# Patient Record
Sex: MALE | Race: BLACK OR AFRICAN AMERICAN | NOT HISPANIC OR LATINO | URBAN - METROPOLITAN AREA
[De-identification: names, ages, dates, MRNs, and addresses within clinical notes are randomized per-mention and may not be internally consistent; named-entity substitution may affect disease eponyms.]

---

## 2024-04-12 ENCOUNTER — EMERGENCY (EMERGENCY)
Facility: HOSPITAL | Age: 44
LOS: 1 days | Discharge: HOME | End: 2024-04-12
Attending: EMERGENCY MEDICINE | Admitting: EMERGENCY MEDICINE
Payer: MEDICAID

## 2024-04-12 VITALS
OXYGEN SATURATION: 96 % | SYSTOLIC BLOOD PRESSURE: 141 MMHG | TEMPERATURE: 98 F | RESPIRATION RATE: 19 BRPM | DIASTOLIC BLOOD PRESSURE: 88 MMHG | HEART RATE: 77 BPM

## 2024-04-12 VITALS
SYSTOLIC BLOOD PRESSURE: 155 MMHG | OXYGEN SATURATION: 100 % | RESPIRATION RATE: 16 BRPM | DIASTOLIC BLOOD PRESSURE: 97 MMHG | TEMPERATURE: 98 F | HEART RATE: 83 BPM

## 2024-04-12 LAB
ALBUMIN SERPL ELPH-MCNC: 4.5 G/DL — SIGNIFICANT CHANGE UP (ref 3.3–5)
ALP SERPL-CCNC: 121 U/L — HIGH (ref 40–120)
ALT FLD-CCNC: 27 U/L — SIGNIFICANT CHANGE UP (ref 4–41)
ANION GAP SERPL CALC-SCNC: 16 MMOL/L — HIGH (ref 7–14)
APPEARANCE UR: ABNORMAL
AST SERPL-CCNC: 31 U/L — SIGNIFICANT CHANGE UP (ref 4–40)
BASOPHILS # BLD AUTO: 0.04 K/UL — SIGNIFICANT CHANGE UP (ref 0–0.2)
BASOPHILS NFR BLD AUTO: 0.4 % — SIGNIFICANT CHANGE UP (ref 0–2)
BILIRUB SERPL-MCNC: 0.8 MG/DL — SIGNIFICANT CHANGE UP (ref 0.2–1.2)
BILIRUB UR-MCNC: NEGATIVE — SIGNIFICANT CHANGE UP
BUN SERPL-MCNC: 14 MG/DL — SIGNIFICANT CHANGE UP (ref 7–23)
CALCIUM SERPL-MCNC: 9.4 MG/DL — SIGNIFICANT CHANGE UP (ref 8.4–10.5)
CHLORIDE SERPL-SCNC: 106 MMOL/L — SIGNIFICANT CHANGE UP (ref 98–107)
CO2 SERPL-SCNC: 20 MMOL/L — LOW (ref 22–31)
COLOR SPEC: ABNORMAL
CREAT SERPL-MCNC: 0.91 MG/DL — SIGNIFICANT CHANGE UP (ref 0.5–1.3)
DIFF PNL FLD: ABNORMAL
EGFR: 107 ML/MIN/1.73M2 — SIGNIFICANT CHANGE UP
EOSINOPHIL # BLD AUTO: 0.11 K/UL — SIGNIFICANT CHANGE UP (ref 0–0.5)
EOSINOPHIL NFR BLD AUTO: 1.2 % — SIGNIFICANT CHANGE UP (ref 0–6)
GLUCOSE SERPL-MCNC: 106 MG/DL — HIGH (ref 70–99)
GLUCOSE UR QL: NEGATIVE MG/DL — SIGNIFICANT CHANGE UP
HCT VFR BLD CALC: 43.7 % — SIGNIFICANT CHANGE UP (ref 39–50)
HGB BLD-MCNC: 15 G/DL — SIGNIFICANT CHANGE UP (ref 13–17)
IANC: 6.22 K/UL — SIGNIFICANT CHANGE UP (ref 1.8–7.4)
IMM GRANULOCYTES NFR BLD AUTO: 0.4 % — SIGNIFICANT CHANGE UP (ref 0–0.9)
KETONES UR-MCNC: NEGATIVE MG/DL — SIGNIFICANT CHANGE UP
LEUKOCYTE ESTERASE UR-ACNC: ABNORMAL
LYMPHOCYTES # BLD AUTO: 2.39 K/UL — SIGNIFICANT CHANGE UP (ref 1–3.3)
LYMPHOCYTES # BLD AUTO: 25.1 % — SIGNIFICANT CHANGE UP (ref 13–44)
MCHC RBC-ENTMCNC: 29.6 PG — SIGNIFICANT CHANGE UP (ref 27–34)
MCHC RBC-ENTMCNC: 34.3 GM/DL — SIGNIFICANT CHANGE UP (ref 32–36)
MCV RBC AUTO: 86.2 FL — SIGNIFICANT CHANGE UP (ref 80–100)
MONOCYTES # BLD AUTO: 0.73 K/UL — SIGNIFICANT CHANGE UP (ref 0–0.9)
MONOCYTES NFR BLD AUTO: 7.7 % — SIGNIFICANT CHANGE UP (ref 2–14)
NEUTROPHILS # BLD AUTO: 6.22 K/UL — SIGNIFICANT CHANGE UP (ref 1.8–7.4)
NEUTROPHILS NFR BLD AUTO: 65.2 % — SIGNIFICANT CHANGE UP (ref 43–77)
NITRITE UR-MCNC: NEGATIVE — SIGNIFICANT CHANGE UP
NRBC # BLD: 0 /100 WBCS — SIGNIFICANT CHANGE UP (ref 0–0)
NRBC # FLD: 0 K/UL — SIGNIFICANT CHANGE UP (ref 0–0)
PH UR: 6 — SIGNIFICANT CHANGE UP (ref 5–8)
PLATELET # BLD AUTO: 286 K/UL — SIGNIFICANT CHANGE UP (ref 150–400)
POTASSIUM SERPL-MCNC: 3.4 MMOL/L — LOW (ref 3.5–5.3)
POTASSIUM SERPL-SCNC: 3.4 MMOL/L — LOW (ref 3.5–5.3)
PROT SERPL-MCNC: 8.1 G/DL — SIGNIFICANT CHANGE UP (ref 6–8.3)
PROT UR-MCNC: 30 MG/DL
RBC # BLD: 5.07 M/UL — SIGNIFICANT CHANGE UP (ref 4.2–5.8)
RBC # FLD: 13.8 % — SIGNIFICANT CHANGE UP (ref 10.3–14.5)
SODIUM SERPL-SCNC: 142 MMOL/L — SIGNIFICANT CHANGE UP (ref 135–145)
SP GR SPEC: 1.01 — SIGNIFICANT CHANGE UP (ref 1–1.03)
UROBILINOGEN FLD QL: 0.2 MG/DL — SIGNIFICANT CHANGE UP (ref 0.2–1)
WBC # BLD: 9.53 K/UL — SIGNIFICANT CHANGE UP (ref 3.8–10.5)
WBC # FLD AUTO: 9.53 K/UL — SIGNIFICANT CHANGE UP (ref 3.8–10.5)

## 2024-04-12 PROCEDURE — 99285 EMERGENCY DEPT VISIT HI MDM: CPT

## 2024-04-12 PROCEDURE — 93010 ELECTROCARDIOGRAM REPORT: CPT

## 2024-04-12 RX ORDER — LIDOCAINE HYDROCHLORIDE AND EPINEPHRINE 10; 10 MG/ML; UG/ML
5 INJECTION, SOLUTION INFILTRATION; PERINEURAL ONCE
Refills: 0 | Status: COMPLETED | OUTPATIENT
Start: 2024-04-12 | End: 2024-04-12

## 2024-04-12 RX ORDER — OXYCODONE AND ACETAMINOPHEN 5; 325 MG/1; MG/1
1 TABLET ORAL
Qty: 9 | Refills: 0
Start: 2024-04-12 | End: 2024-04-14

## 2024-04-12 RX ORDER — MORPHINE SULFATE 50 MG/1
4 CAPSULE, EXTENDED RELEASE ORAL ONCE
Refills: 0 | Status: DISCONTINUED | OUTPATIENT
Start: 2024-04-12 | End: 2024-04-12

## 2024-04-12 RX ORDER — MIDAZOLAM HYDROCHLORIDE 1 MG/ML
5 INJECTION, SOLUTION INTRAMUSCULAR; INTRAVENOUS ONCE
Refills: 0 | Status: DISCONTINUED | OUTPATIENT
Start: 2024-04-12 | End: 2024-04-12

## 2024-04-12 RX ORDER — FENTANYL CITRATE 50 UG/ML
50 INJECTION INTRAVENOUS ONCE
Refills: 0 | Status: DISCONTINUED | OUTPATIENT
Start: 2024-04-12 | End: 2024-04-12

## 2024-04-12 RX ORDER — CEPHALEXIN 500 MG
500 CAPSULE ORAL ONCE
Refills: 0 | Status: COMPLETED | OUTPATIENT
Start: 2024-04-12 | End: 2024-04-12

## 2024-04-12 RX ORDER — HYDROMORPHONE HYDROCHLORIDE 2 MG/ML
1 INJECTION INTRAMUSCULAR; INTRAVENOUS; SUBCUTANEOUS ONCE
Refills: 0 | Status: DISCONTINUED | OUTPATIENT
Start: 2024-04-12 | End: 2024-04-12

## 2024-04-12 RX ADMIN — LIDOCAINE HYDROCHLORIDE AND EPINEPHRINE 5 MILLILITER(S): 10; 10 INJECTION, SOLUTION INFILTRATION; PERINEURAL at 04:00

## 2024-04-12 RX ADMIN — HYDROMORPHONE HYDROCHLORIDE 1 MILLIGRAM(S): 2 INJECTION INTRAMUSCULAR; INTRAVENOUS; SUBCUTANEOUS at 09:03

## 2024-04-12 RX ADMIN — MIDAZOLAM HYDROCHLORIDE 5 MILLIGRAM(S): 1 INJECTION, SOLUTION INTRAMUSCULAR; INTRAVENOUS at 08:35

## 2024-04-12 RX ADMIN — FENTANYL CITRATE 50 MICROGRAM(S): 50 INJECTION INTRAVENOUS at 04:42

## 2024-04-12 RX ADMIN — FENTANYL CITRATE 50 MICROGRAM(S): 50 INJECTION INTRAVENOUS at 04:17

## 2024-04-12 RX ADMIN — MORPHINE SULFATE 4 MILLIGRAM(S): 50 CAPSULE, EXTENDED RELEASE ORAL at 01:29

## 2024-04-12 RX ADMIN — Medication 500 MILLIGRAM(S): at 07:02

## 2024-04-12 NOTE — ED PROVIDER NOTE - ATTENDING CONTRIBUTION TO CARE
I, Dr Alfonso Hoffmann wrote the initial note in its entirety and the resident only contributed to the progress note and disposition with which I agree.

## 2024-04-12 NOTE — ED ADULT NURSE REASSESSMENT NOTE - NS ED NURSE REASSESS COMMENT FT1
Per ED Attending, Pt cleared to be d/c home. Rose connect to leg bag, draining freely. Peripheral IV discontinued at this time  A/Ox4, ambulated with steady gait, Vital signs stable

## 2024-04-12 NOTE — CHART NOTE - NSCHARTNOTEFT_GEN_A_CORE
was informed by provider that patient is discharged and need assistance with getting taxi home.       scheduled taxi through [facility] portal. Patient will be picked up by [employer] [phone]; Inv# [id number]. Provider informed of above. No further  intervention needed.

## 2024-04-12 NOTE — ED ADULT TRIAGE NOTE - CHIEF COMPLAINT QUOTE
pt c/o worsening urinary retention today. states symptoms have been x1 month, scheduled for urethrotomy 4/29. Last urination 10am. +abdominal distention. hx. HTN. aortic dissection 11/25/23. pt denies chest pain, sob, n/v.

## 2024-04-12 NOTE — CONSULT NOTE ADULT - SUBJECTIVE AND OBJECTIVE BOX
[facility]  43-year-old man with a history of hypertension and aortic dissection s/p repair 11/25/2023 that presents with urinary retention x 12 hours.  Patient states that when he was diagnosed with a aortic dissection in November of last year he had to go to the emergency surgery and at that time they inserted a Rose for surgery and since then he has been diagnosed with urethral strictures with intermittent stricture urinary retention.  He follows with Dr. Perry Chery at [facility] urology.  Patient is scheduled for cystoscopy and urethrotomy 4/29/2024 however tonight he has been able to urinate leading to swelling and distention of his lower abdomen and constipation and diaphoresis.  Patient states that this happened before but was told by his urologist that if he does need a urethral catheter and that it needs to be small so not to cause any more trauma to his urethra.  Patient denies any fevers, chills, chest pain, shortness of breath.  Has been otherwise usual state of health.    Urology consulted for urinary retention. Patient has history of urethral stricture and has surgery scheduled this month for stricture. Patient denies fevers, chills, flank pain, dysuria. He is able to void small amounts but needs to strain.      PAST MEDICAL & SURGICAL HISTORY:      MEDICATIONS  (STANDING):    MEDICATIONS  (PRN):      FAMILY HISTORY:      Allergies    No Known Allergies    Intolerances        SOCIAL HISTORY:    REVIEW OF SYSTEMS:   Otherwise negative as stated in HPI    Physical Exam  Vital signs  T(C): 36.8 (04-12-24 @ 07:28), Max: 36.9 (04-12-24 @ 00:16)  HR: 98 (04-12-24 @ 08:35)  BP: 158/105 (04-12-24 @ 08:35)  SpO2: 97% (04-12-24 @ 08:35)  Wt(kg): --    Output      Gen: NAD  Pulm: No respiratory distress  Abd: Soft, nontender, nondistended, palpable bladder      LABS:      04-12 @ 01:25    WBC 9.53  / Hct 43.7  / SCr 0.91     04-12    142  |  106  |  14  ----------------------------<  106<H>  3.4<L>   |  20<L>  |  0.91    Ca    9.4      12 Apr 2024 01:25    TPro  8.1  /  Alb  4.5  /  TBili  0.8  /  DBili  x   /  AST  31  /  ALT  27  /  AlkPhos  121<H>  04-12      Urinalysis Basic - ( 12 Apr 2024 01:25 )    Color: x / Appearance: x / SG: x / pH: x  Gluc: 106 mg/dL / Ketone: x  / Bili: x / Urobili: x   Blood: x / Protein: x / Nitrite: x   Leuk Esterase: x / RBC: x / WBC x   Sq Epi: x / Non Sq Epi: x / Bacteria: x

## 2024-04-12 NOTE — ED PROVIDER NOTE - PATIENT PORTAL LINK FT
You can access the FollowMyHealth Patient Portal offered by [facility] by registering at the following website: [email or web address]. By joining [employer]’s FollowMyHealth portal, you will also be able to view your health information using other applications (apps) compatible with our system.

## 2024-04-12 NOTE — ED PROVIDER NOTE - NSFOLLOWUPINSTRUCTIONS_ED_ALL_ED_FT
Urinary Retention    Urinary retention is the inability to completely empty your bladder. This is a common problem in older men, especially with enlarged prostates. If you are sent home with a kline catheter and a drainage system make sure to keep the drainage bag emptied and lower than your catheter. Keep the kline catheter in until you follow up with a urologist.    SEEK IMMEDIATE MEDICAL CARE IF YOU DEVELOP THE FOLLOWING SYMPTOMS: the catheter stops draining urine, the catheter falls out, abdominal pain, nausea/vomiting, or chills/fever.

## 2024-04-12 NOTE — ED ADULT NURSE REASSESSMENT NOTE - NS ED NURSE REASSESS COMMENT FT1
Francesca RN: Pt a&ox4, ambulatory, presents uncomfortable, diaphoretic, grimacing, and pacing around the room, endorsing severe pelvic pain. 20GIV placed to right forearm, labs collected and sent off. Pt medicated as per MD orders. Airway is patent, speaking in clear and coherent sentences. Respirations are even and unlabored, no signs of respiratory distress. Pt educated on possible side effects of morphine and fall precautions. Pt confirmed understanding of this education.

## 2024-04-12 NOTE — CONSULT NOTE ADULT - ASSESSMENT
43 year old male w/ urethral stricture presenting with urinary retention.    - Suprapubic catheter placed at bedside  - Recommend dose of ceftriaxone and 7d Bactrim  - Follow up with Dr. Chery as outpatient    Case discussed with Dr. Chery

## 2024-04-12 NOTE — ED PROVIDER NOTE - PROGRESS NOTE DETAILS
RADHA: I spoke to Dr Puga, urology attending on call, she will get in contact with URO resident to see pt. RADHA: URO paged again, no answer RADHA: URO paged for urinary retention with uretheral strictures RADHA: URO at bedside performing suprapubic catheter insertion, pt pain worsening, will provide fentanyl and monitor. Jose THOMAS PGY3: suprapubic cath placed by urology. pt with relief of pain after procedure. alert and oriented after meds. urology recommending dc with uro follow up. safe for d/c with analgesia rx and strict return precautions given.

## 2024-04-12 NOTE — ED PROVIDER NOTE - OBJECTIVE STATEMENT
43-year-old man with a history of hypertension and aortic dissection s/p repair 11/25/2023 that presents with urinary retention x 12 hours.  Patient states that when he was diagnosed with a aortic dissection in November of last year he had to go to the emergency surgery and at that time they inserted a Rose for surgery and since then he has been diagnosed with urethral strictures with intermittent stricture urinary retention.  He follows with Dr. Perry Chery at [facility] urology.  Patient is scheduled for cystoscopy and urethrotomy 4/29/2024 however tonight he has been able to urinate leading to swelling and distention of his lower abdomen and constipation and diaphoresis.  Patient states that this happened before but was told by his urologist that if he does need a urethral catheter and that it needs to be small so not to cause any more trauma to his urethra.  Patient denies any fevers, chills, chest pain, shortness of breath.  Has been otherwise usual state of health.

## 2024-04-12 NOTE — ED ADULT NURSE REASSESSMENT NOTE - NS ED NURSE REASSESS COMMENT FT1
Pt received from previous shift on stretcher, A/Ox4 answers all questions appropriately. Urology currently at the bedside, pending subpubic catheter insertion. Administered 5mg Versed IVP per Urology MD Attending order, refer to work list. Pt denies chest pain and SOB during reassessment, breathing is even and unlabored on room air. Abdomen is firm and distended, tender to touch. Pt ambulates independently at baseline, moves all four extremities on command, skin is intact. Pt resting at the bedside, no apparent acute distress noted prior to procedure. Vital signs stable refer to flow sheet, NKA to medications. Pending subpubic cath procedure.

## 2024-04-12 NOTE — ED ADULT NURSE REASSESSMENT NOTE - NS ED NURSE REASSESS COMMENT FT1
Patient AOx4 and in no signs of acute distress. Wife at bedside. Respirations even and unlabored, patient on room air. Patient denies chest pain, n/v, palpitations or SOB. Comfort measures maintained. Safety Maintained. Urology following. Patient AOx4 and in no signs of acute distress. family at bedside. Respirations even and unlabored, patient on room air. Patient denies chest pain, n/v, palpitations or SOB. Comfort measures maintained. Safety Maintained. Urology following.

## 2024-04-12 NOTE — ED PROVIDER NOTE - PHYSICAL EXAMINATION
Uncomfortable appearing pacing skin diaphoretic tenderness in lower abdomen with masses but no rebound or guarding.  No CVA tenderness bilaterally.  Gait is steady and stable.  No gross musculoskeletal deformities.

## 2024-04-12 NOTE — ED ADULT NURSE NOTE - NSFALLHARMRISKINTERV_ED_ALL_ED

## 2024-04-12 NOTE — ED ADULT NURSE NOTE - OBJECTIVE STATEMENT
pt received to tr kumar castaneda&Ox4, amb at baseline. pmh of HTN, aortic dissection. pt c/o x1 month urinary retention occurring intermittently now worsening x1 day since 1030am. pt endorses has not been able to urinate since 1030am 4/11. is scheduled for urethrotomy on 4/29, called urologist and advised to come to ER due to urinary retention. pt states has scar tissue and urethral stenosis. MD at bedside and aware. urology called for kline catheter placement. IV started, labs collected and sent, pt medicated for 10/10. abdominal distention noted with +1 bilateral lower extremity swelling. noted to be diaphoretic due to pain. denies CP, SOB, n/v, fevers, chills. safety maintained. awaiting urology

## 2024-04-12 NOTE — PROCEDURE NOTE - ADDITIONAL PROCEDURE DETAILS
Using ultrasound guidance, wire placed into bladder and dilated to 24 Fr with dilator set. 16 Fr [facility] [facility] IRA placed over wire.

## 2024-04-12 NOTE — ED PROVIDER NOTE - CLINICAL SUMMARY MEDICAL DECISION MAKING FREE TEXT BOX
43-year-old male with a history of hypertension and aortic dissection status post repair in November 2023 that presents with acute onset urinary retention noted to have urethral strictures that he is being followed by urology outpatient for.  Patient is scheduled for urethrotomy at the end of April but tonight had 12 hours of urinary retention.  At this time as patient appears uncomfortable and is hypertensive this is likely secondary to his uncomfortability as patient is pacing in the emergency room and diaphoretic.  At this time we will consult urology for catheter placement.  In the meantime we will obtain labs and provide pain medications and closely monitor.  Clinically there is no indication for imaging at this time unless urology warrants or requests imaging.

## 2024-04-13 LAB
CULTURE RESULTS: NO GROWTH — SIGNIFICANT CHANGE UP
SPECIMEN SOURCE: SIGNIFICANT CHANGE UP

## 2024-04-15 ENCOUNTER — OUTPATIENT (OUTPATIENT)
Dept: OUTPATIENT SERVICES | Facility: HOSPITAL | Age: 44
LOS: 1 days | End: 2024-04-15
Payer: MEDICAID

## 2024-04-15 VITALS
WEIGHT: 276.02 LBS | RESPIRATION RATE: 18 BRPM | OXYGEN SATURATION: 96 % | SYSTOLIC BLOOD PRESSURE: 134 MMHG | HEIGHT: 71 IN | TEMPERATURE: 99 F | HEART RATE: 82 BPM | DIASTOLIC BLOOD PRESSURE: 85 MMHG

## 2024-04-15 DIAGNOSIS — G47.33 OBSTRUCTIVE SLEEP APNEA (ADULT) (PEDIATRIC): ICD-10-CM

## 2024-04-15 DIAGNOSIS — Z01.818 ENCOUNTER FOR OTHER PREPROCEDURAL EXAMINATION: ICD-10-CM

## 2024-04-15 DIAGNOSIS — Z95.2 PRESENCE OF PROSTHETIC HEART VALVE: Chronic | ICD-10-CM

## 2024-04-15 DIAGNOSIS — Z86.79 PERSONAL HISTORY OF OTHER DISEASES OF THE CIRCULATORY SYSTEM: ICD-10-CM

## 2024-04-15 DIAGNOSIS — N35.814 OTHER ANTERIOR URETHRAL STRICTURE, MALE: ICD-10-CM

## 2024-04-15 DIAGNOSIS — Z87.448 PERSONAL HISTORY OF OTHER DISEASES OF URINARY SYSTEM: ICD-10-CM

## 2024-04-15 DIAGNOSIS — Z98.890 OTHER SPECIFIED POSTPROCEDURAL STATES: Chronic | ICD-10-CM

## 2024-04-15 DIAGNOSIS — Z86.79 PERSONAL HISTORY OF OTHER DISEASES OF THE CIRCULATORY SYSTEM: Chronic | ICD-10-CM

## 2024-04-15 LAB
ALBUMIN SERPL ELPH-MCNC: 4.3 G/DL — SIGNIFICANT CHANGE UP (ref 3.3–5)
ALP SERPL-CCNC: 110 U/L — SIGNIFICANT CHANGE UP (ref 40–120)
ALT FLD-CCNC: 26 U/L — SIGNIFICANT CHANGE UP (ref 10–45)
ANION GAP SERPL CALC-SCNC: 12 MMOL/L — SIGNIFICANT CHANGE UP (ref 5–17)
AST SERPL-CCNC: 22 U/L — SIGNIFICANT CHANGE UP (ref 10–40)
BILIRUB SERPL-MCNC: 0.4 MG/DL — SIGNIFICANT CHANGE UP (ref 0.2–1.2)
BUN SERPL-MCNC: 13 MG/DL — SIGNIFICANT CHANGE UP (ref 7–23)
CALCIUM SERPL-MCNC: 9.6 MG/DL — SIGNIFICANT CHANGE UP (ref 8.4–10.5)
CHLORIDE SERPL-SCNC: 105 MMOL/L — SIGNIFICANT CHANGE UP (ref 96–108)
CO2 SERPL-SCNC: 26 MMOL/L — SIGNIFICANT CHANGE UP (ref 22–31)
CREAT SERPL-MCNC: 0.92 MG/DL — SIGNIFICANT CHANGE UP (ref 0.5–1.3)
EGFR: 105 ML/MIN/1.73M2 — SIGNIFICANT CHANGE UP
GLUCOSE SERPL-MCNC: 92 MG/DL — SIGNIFICANT CHANGE UP (ref 70–99)
HCT VFR BLD CALC: 44.8 % — SIGNIFICANT CHANGE UP (ref 39–50)
HGB BLD-MCNC: 15.1 G/DL — SIGNIFICANT CHANGE UP (ref 13–17)
MCHC RBC-ENTMCNC: 29.6 PG — SIGNIFICANT CHANGE UP (ref 27–34)
MCHC RBC-ENTMCNC: 33.7 GM/DL — SIGNIFICANT CHANGE UP (ref 32–36)
MCV RBC AUTO: 87.8 FL — SIGNIFICANT CHANGE UP (ref 80–100)
NRBC # BLD: 0 /100 WBCS — SIGNIFICANT CHANGE UP (ref 0–0)
PLATELET # BLD AUTO: 297 K/UL — SIGNIFICANT CHANGE UP (ref 150–400)
POTASSIUM SERPL-MCNC: 3.4 MMOL/L — LOW (ref 3.5–5.3)
POTASSIUM SERPL-SCNC: 3.4 MMOL/L — LOW (ref 3.5–5.3)
PROT SERPL-MCNC: 8.3 G/DL — SIGNIFICANT CHANGE UP (ref 6–8.3)
RBC # BLD: 5.1 M/UL — SIGNIFICANT CHANGE UP (ref 4.2–5.8)
RBC # FLD: 13.3 % — SIGNIFICANT CHANGE UP (ref 10.3–14.5)
SODIUM SERPL-SCNC: 143 MMOL/L — SIGNIFICANT CHANGE UP (ref 135–145)
WBC # BLD: 7.5 K/UL — SIGNIFICANT CHANGE UP (ref 3.8–10.5)
WBC # FLD AUTO: 7.5 K/UL — SIGNIFICANT CHANGE UP (ref 3.8–10.5)

## 2024-04-15 PROCEDURE — 85027 COMPLETE CBC AUTOMATED: CPT

## 2024-04-15 PROCEDURE — G0463: CPT

## 2024-04-15 PROCEDURE — 80053 COMPREHEN METABOLIC PANEL: CPT

## 2024-04-15 RX ORDER — TAMSULOSIN HYDROCHLORIDE 0.4 MG/1
1 CAPSULE ORAL
Refills: 0 | DISCHARGE

## 2024-04-15 RX ORDER — METOPROLOL TARTRATE 50 MG
3 TABLET ORAL
Refills: 0 | DISCHARGE

## 2024-04-15 RX ORDER — AMLODIPINE BESYLATE 2.5 MG/1
1 TABLET ORAL
Refills: 0 | DISCHARGE

## 2024-04-15 RX ORDER — LOSARTAN POTASSIUM 100 MG/1
1 TABLET, FILM COATED ORAL
Refills: 0 | DISCHARGE

## 2024-04-15 RX ORDER — ATORVASTATIN CALCIUM 80 MG/1
1 TABLET, FILM COATED ORAL
Refills: 0 | DISCHARGE

## 2024-04-15 RX ORDER — ASPIRIN/CALCIUM CARB/MAGNESIUM 324 MG
1 TABLET ORAL
Refills: 0 | DISCHARGE

## 2024-04-15 NOTE — H&P PST ADULT - PROBLEM SELECTOR PLAN 1
Plan- scheduled for cystourethroscopy with direct vision internal urethrotomy   preop instruction provided verbally and in writing, both patient and his mother verbalized understanding

## 2024-04-15 NOTE — H&P PST ADULT - HISTORY OF PRESENT ILLNESS
11/25/2023  43 year old male with h/o cardiomyopathy s/p AVR/ aortic root dissection repair (Bentall procedure/ pericardial tissue heart valve @[facility]) on 11/25/2024, hypertension, hyperlipidemia, obesity, SHIVA (per criteria), current smoker, presents to [facility] for scheduled cystourethroscopy with direct vision internal urethrotomy for urethral stricture on 4/16/2024. Patient reports having difficulty urinating on 4/11/2024, was seeing in the ED at [facility] and suprapubic catheter placed. Patient c/o mild discomfort, no SOB, no CP.

## 2024-04-15 NOTE — H&P PST ADULT - TRANSFUSION HX COMMENT, PROFILE
? blood transfusion 11/2023, open heart surgery ? blood transfusion 11/2023, not sure, had open heart surgery

## 2024-04-15 NOTE — H&P PST ADULT - NSANTHOSAYNRD_GEN_A_CORE
per criteria/No. SHIVA screening performed.  STOP BANG Legend: 0-2 = LOW Risk; 3-4 = INTERMEDIATE Risk; 5-8 = HIGH Risk

## 2024-04-15 NOTE — H&P PST ADULT - ASSESSMENT
Dasi activities: ADLs, able to walk up 2 flights stair Dasi activities: ADLs, able to walk up 2 flights stair without SOB, no CP  Dasi score: 5.59  Patient denies any loose or broken tooth

## 2024-04-15 NOTE — H&P PST ADULT - PROBLEM SELECTOR PLAN 3
cardiac evaluation report on file including ekg report   instructed to continue his antihypertensive medication per prescriber   patient will also continue his aspirin

## 2024-04-15 NOTE — H&P PST ADULT - NSICDXPASTMEDICALHX_GEN_ALL_CORE_FT
PAST MEDICAL HISTORY:  H/O: hypertension     History of aortic dissection     History of cardiomyopathy     Hyperlipidemia     SHIVA (obstructive sleep apnea)      PAST MEDICAL HISTORY:  H/O urethral stricture     H/O: hypertension     History of aortic dissection     History of cardiomyopathy     Hyperlipidemia     SHIVA (obstructive sleep apnea)

## 2024-04-15 NOTE — H&P PST ADULT - NSICDXPROCEDURE_GEN_ALL_CORE_FT
PROCEDURES:  Release, urethral stricture 15-Apr-2024 16:27:45 anterior urethral stricture [place]